# Patient Record
Sex: MALE | Race: WHITE
[De-identification: names, ages, dates, MRNs, and addresses within clinical notes are randomized per-mention and may not be internally consistent; named-entity substitution may affect disease eponyms.]

---

## 2019-03-10 ENCOUNTER — HOSPITAL ENCOUNTER (INPATIENT)
Dept: HOSPITAL 94 - ER | Age: 59
LOS: 5 days | Discharge: HOME HEALTH | DRG: 234 | End: 2019-03-15
Attending: THORACIC SURGERY (CARDIOTHORACIC VASCULAR SURGERY) | Admitting: HOSPITALIST
Payer: COMMERCIAL

## 2019-03-10 VITALS — WEIGHT: 223.11 LBS | BODY MASS INDEX: 30.22 KG/M2 | HEIGHT: 72 IN

## 2019-03-10 VITALS — SYSTOLIC BLOOD PRESSURE: 145 MMHG | DIASTOLIC BLOOD PRESSURE: 65 MMHG

## 2019-03-10 VITALS — SYSTOLIC BLOOD PRESSURE: 163 MMHG | DIASTOLIC BLOOD PRESSURE: 78 MMHG

## 2019-03-10 VITALS — DIASTOLIC BLOOD PRESSURE: 77 MMHG | SYSTOLIC BLOOD PRESSURE: 135 MMHG

## 2019-03-10 VITALS — SYSTOLIC BLOOD PRESSURE: 142 MMHG | DIASTOLIC BLOOD PRESSURE: 80 MMHG

## 2019-03-10 VITALS — SYSTOLIC BLOOD PRESSURE: 166 MMHG | DIASTOLIC BLOOD PRESSURE: 81 MMHG

## 2019-03-10 VITALS — SYSTOLIC BLOOD PRESSURE: 119 MMHG | DIASTOLIC BLOOD PRESSURE: 57 MMHG

## 2019-03-10 VITALS — SYSTOLIC BLOOD PRESSURE: 167 MMHG | DIASTOLIC BLOOD PRESSURE: 81 MMHG

## 2019-03-10 VITALS — SYSTOLIC BLOOD PRESSURE: 135 MMHG | DIASTOLIC BLOOD PRESSURE: 65 MMHG

## 2019-03-10 VITALS — SYSTOLIC BLOOD PRESSURE: 164 MMHG | DIASTOLIC BLOOD PRESSURE: 79 MMHG

## 2019-03-10 VITALS — SYSTOLIC BLOOD PRESSURE: 138 MMHG | DIASTOLIC BLOOD PRESSURE: 74 MMHG

## 2019-03-10 VITALS — SYSTOLIC BLOOD PRESSURE: 180 MMHG | DIASTOLIC BLOOD PRESSURE: 79 MMHG

## 2019-03-10 DIAGNOSIS — Z79.82: ICD-10-CM

## 2019-03-10 DIAGNOSIS — I21.4: Primary | ICD-10-CM

## 2019-03-10 DIAGNOSIS — M54.9: ICD-10-CM

## 2019-03-10 DIAGNOSIS — Z80.0: ICD-10-CM

## 2019-03-10 DIAGNOSIS — M54.32: ICD-10-CM

## 2019-03-10 DIAGNOSIS — I65.21: ICD-10-CM

## 2019-03-10 DIAGNOSIS — I25.110: ICD-10-CM

## 2019-03-10 DIAGNOSIS — F17.200: ICD-10-CM

## 2019-03-10 DIAGNOSIS — Z80.41: ICD-10-CM

## 2019-03-10 DIAGNOSIS — G89.29: ICD-10-CM

## 2019-03-10 DIAGNOSIS — I51.3: ICD-10-CM

## 2019-03-10 LAB
ALBUMIN SERPL BCP-MCNC: 3.5 G/DL (ref 3.4–5)
ALBUMIN SERPL BCP-MCNC: 3.7 G/DL (ref 3.4–5)
ALBUMIN/GLOB SERPL: 1.2 {RATIO} (ref 1.1–1.5)
ALBUMIN/GLOB SERPL: 1.2 {RATIO} (ref 1.1–1.5)
ALP SERPL-CCNC: 74 IU/L (ref 46–116)
ALP SERPL-CCNC: 96 IU/L (ref 46–116)
ALT SERPL W P-5'-P-CCNC: 31 U/L (ref 12–78)
ALT SERPL W P-5'-P-CCNC: 33 U/L (ref 12–78)
AMPHETAMINES UR QL SCN: NEGATIVE
ANION GAP SERPL CALCULATED.3IONS-SCNC: 10 MMOL/L (ref 8–16)
ANION GAP SERPL CALCULATED.3IONS-SCNC: 10 MMOL/L (ref 8–16)
APTT PPP: 27 SECONDS (ref 22–32)
AST SERPL W P-5'-P-CCNC: 16 U/L (ref 10–37)
AST SERPL W P-5'-P-CCNC: 17 U/L (ref 10–37)
BARBITURATES UR QL SCN: NEGATIVE
BASE EXCESS BLDA CALC-SCNC: -1 MMOL/L (ref -2–3)
BASOPHILS # BLD AUTO: 0.1 X10'3 (ref 0–0.2)
BASOPHILS NFR BLD AUTO: 0.8 % (ref 0–1)
BENZODIAZ UR QL SCN: NEGATIVE
BILIRUB SERPL-MCNC: 0.2 MG/DL (ref 0.1–1)
BILIRUB SERPL-MCNC: 0.3 MG/DL (ref 0.1–1)
BODY TEMPERATURE: 37
BUN SERPL-MCNC: 12 MG/DL (ref 7–18)
BUN SERPL-MCNC: 14 MG/DL (ref 7–18)
BUN/CREAT SERPL: 13.6 (ref 5.4–32)
BUN/CREAT SERPL: 13.7 (ref 5.4–32)
BZE UR QL SCN: NEGATIVE
CALCIUM SERPL-MCNC: 8.8 MG/DL (ref 8.5–10.1)
CALCIUM SERPL-MCNC: 9 MG/DL (ref 8.5–10.1)
CANNABINOIDS UR QL SCN: NEGATIVE
CHLORIDE SERPL-SCNC: 106 MMOL/L (ref 99–107)
CHLORIDE SERPL-SCNC: 107 MMOL/L (ref 99–107)
CLARITY UR: CLEAR
CO2 SERPL-SCNC: 22.5 MMOL/L (ref 24–32)
CO2 SERPL-SCNC: 24.4 MMOL/L (ref 24–32)
COHGB MFR BLDA: 1.1 % (ref 0.5–1.5)
COLOR UR: YELLOW
CREAT SERPL-MCNC: 0.88 MG/DL (ref 0.6–1.1)
CREAT SERPL-MCNC: 1.02 MG/DL (ref 0.6–1.1)
EOSINOPHIL # BLD AUTO: 0.2 X10'3 (ref 0–0.9)
EOSINOPHIL NFR BLD AUTO: 2.2 % (ref 0–6)
ERYTHROCYTE [DISTWIDTH] IN BLOOD BY AUTOMATED COUNT: 13.9 % (ref 11.5–14.5)
GFR SERPL CREATININE-BSD FRML MDRD: 75 ML/MIN
GFR SERPL CREATININE-BSD FRML MDRD: 89 ML/MIN
GLUCOSE SERPL-MCNC: 122 MG/DL (ref 70–104)
GLUCOSE SERPL-MCNC: 99 MG/DL (ref 70–104)
GLUCOSE UR STRIP-MCNC: NEGATIVE MG/DL
HBA1C MFR BLD: 6.6 % (ref 4.5–6.2)
HCO3 BLDA-SCNC: 22.6 MMOL/L (ref 22–26)
HCT VFR BLD AUTO: 43.1 % (ref 42–52)
HGB BLD-MCNC: 14.8 G/DL (ref 14–17.9)
HGB BLDA-MCNC: 13.7 G/DL (ref 14–18)
HGB UR QL STRIP: NEGATIVE
INHALED O2 FLOW RATE: 2 L/MIN
INR PPP: 0.9 INR
KETONES UR STRIP-MCNC: NEGATIVE MG/DL
LEUKOCYTE ESTERASE UR QL STRIP: NEGATIVE
LYMPHOCYTES # BLD AUTO: 2.7 X10'3 (ref 1.1–4.8)
LYMPHOCYTES NFR BLD AUTO: 34.3 % (ref 21–51)
MCH RBC QN AUTO: 33.2 PG (ref 27–31)
MCHC RBC AUTO-ENTMCNC: 34.5 G/DL (ref 33–36.5)
MCV RBC AUTO: 96.4 FL (ref 78–98)
METHADONE UR QL SCN: NEGATIVE
METHGB MFR BLDA: 0.2 % (ref 0.3–1.12)
MONOCYTES # BLD AUTO: 0.6 X10'3 (ref 0–0.9)
MONOCYTES NFR BLD AUTO: 8.2 % (ref 2–12)
NEUTROPHILS # BLD AUTO: 4.3 X10'3 (ref 1.8–7.7)
NEUTROPHILS NFR BLD AUTO: 54.5 % (ref 42–75)
NITRITE UR QL STRIP: NEGATIVE
OPIATES UR QL SCN: NEGATIVE
OXYHGB MFR BLDA: 96 % (ref 94–100)
PCO2 TEMP ADJ BLDA: 34.5 MMHG (ref 35–48)
PCP UR QL SCN: NEGATIVE
PH TEMP ADJ BLDA: 7.43 [PH] (ref 7.35–7.45)
PH UR STRIP: 5 [PH] (ref 4.8–8)
PLATELET # BLD AUTO: 240 X10'3 (ref 140–440)
PMV BLD AUTO: 7.5 FL (ref 7.4–10.4)
PO2 TEMP ADJ BLD: 101.5 MMHG (ref 83–108)
POTASSIUM SERPL-SCNC: 4.1 MMOL/L (ref 3.5–5.1)
POTASSIUM SERPL-SCNC: 4.4 MMOL/L (ref 3.5–5.1)
PROT SERPL-MCNC: 6.4 G/DL (ref 6.4–8.2)
PROT SERPL-MCNC: 6.9 G/DL (ref 6.4–8.2)
PROT UR QL STRIP: NEGATIVE MG/DL
PROTHROMBIN TIME: 9.4 SECONDS (ref 9–12)
RBC # BLD AUTO: 4.47 X10'6 (ref 4.7–6.1)
SAO2 % BLDA: 97.3 % (ref 95–98)
SODIUM SERPL-SCNC: 139 MMOL/L (ref 135–145)
SODIUM SERPL-SCNC: 140 MMOL/L (ref 135–145)
SP GR UR STRIP: 1.01 (ref 1–1.03)
URN COLLECT METHOD CLASS: (no result)
UROBILINOGEN UR STRIP-MCNC: 0.2 E.U/DL (ref 0.2–1)
WBC # BLD AUTO: 7.8 X10'3 (ref 4.5–11)

## 2019-03-10 PROCEDURE — 93325 DOPPLER ECHO COLOR FLOW MAPG: CPT

## 2019-03-10 PROCEDURE — 94060 EVALUATION OF WHEEZING: CPT

## 2019-03-10 PROCEDURE — 80048 BASIC METABOLIC PNL TOTAL CA: CPT

## 2019-03-10 PROCEDURE — 85347 COAGULATION TIME ACTIVATED: CPT

## 2019-03-10 PROCEDURE — 93306 TTE W/DOPPLER COMPLETE: CPT

## 2019-03-10 PROCEDURE — 85025 COMPLETE CBC W/AUTO DIFF WBC: CPT

## 2019-03-10 PROCEDURE — 97530 THERAPEUTIC ACTIVITIES: CPT

## 2019-03-10 PROCEDURE — 87070 CULTURE OTHR SPECIMN AEROBIC: CPT

## 2019-03-10 PROCEDURE — 94002 VENT MGMT INPAT INIT DAY: CPT

## 2019-03-10 PROCEDURE — 86920 COMPATIBILITY TEST SPIN: CPT

## 2019-03-10 PROCEDURE — 97116 GAIT TRAINING THERAPY: CPT

## 2019-03-10 PROCEDURE — 97161 PT EVAL LOW COMPLEX 20 MIN: CPT

## 2019-03-10 PROCEDURE — 96374 THER/PROPH/DIAG INJ IV PUSH: CPT

## 2019-03-10 PROCEDURE — 84132 ASSAY OF SERUM POTASSIUM: CPT

## 2019-03-10 PROCEDURE — 93458 L HRT ARTERY/VENTRICLE ANGIO: CPT

## 2019-03-10 PROCEDURE — 99152 MOD SED SAME PHYS/QHP 5/>YRS: CPT

## 2019-03-10 PROCEDURE — 84100 ASSAY OF PHOSPHORUS: CPT

## 2019-03-10 PROCEDURE — 94668 MNPJ CHEST WALL SBSQ: CPT

## 2019-03-10 PROCEDURE — 83735 ASSAY OF MAGNESIUM: CPT

## 2019-03-10 PROCEDURE — 93005 ELECTROCARDIOGRAM TRACING: CPT

## 2019-03-10 PROCEDURE — 82803 BLOOD GASES ANY COMBINATION: CPT

## 2019-03-10 PROCEDURE — 82948 REAGENT STRIP/BLOOD GLUCOSE: CPT

## 2019-03-10 PROCEDURE — 36415 COLL VENOUS BLD VENIPUNCTURE: CPT

## 2019-03-10 PROCEDURE — 84484 ASSAY OF TROPONIN QUANT: CPT

## 2019-03-10 PROCEDURE — 82330 ASSAY OF CALCIUM: CPT

## 2019-03-10 PROCEDURE — B41C1ZZ FLUOROSCOPY OF PELVIC ARTERIES USING LOW OSMOLAR CONTRAST: ICD-10-PCS | Performed by: INTERNAL MEDICINE

## 2019-03-10 PROCEDURE — 86885 COOMBS TEST INDIRECT QUAL: CPT

## 2019-03-10 PROCEDURE — 93970 EXTREMITY STUDY: CPT

## 2019-03-10 PROCEDURE — 93880 EXTRACRANIAL BILAT STUDY: CPT

## 2019-03-10 PROCEDURE — 80061 LIPID PANEL: CPT

## 2019-03-10 PROCEDURE — B41F1ZZ FLUOROSCOPY OF RIGHT LOWER EXTREMITY ARTERIES USING LOW OSMOLAR CONTRAST: ICD-10-PCS | Performed by: INTERNAL MEDICINE

## 2019-03-10 PROCEDURE — B31N1ZZ FLUOROSCOPY OF OTHER UPPER ARTERIES USING LOW OSMOLAR CONTRAST: ICD-10-PCS | Performed by: INTERNAL MEDICINE

## 2019-03-10 PROCEDURE — 86900 BLOOD TYPING SEROLOGIC ABO: CPT

## 2019-03-10 PROCEDURE — 85384 FIBRINOGEN ACTIVITY: CPT

## 2019-03-10 PROCEDURE — 81003 URINALYSIS AUTO W/O SCOPE: CPT

## 2019-03-10 PROCEDURE — 84295 ASSAY OF SERUM SODIUM: CPT

## 2019-03-10 PROCEDURE — 80053 COMPREHEN METABOLIC PANEL: CPT

## 2019-03-10 PROCEDURE — B2151ZZ FLUOROSCOPY OF LEFT HEART USING LOW OSMOLAR CONTRAST: ICD-10-PCS | Performed by: INTERNAL MEDICINE

## 2019-03-10 PROCEDURE — 85610 PROTHROMBIN TIME: CPT

## 2019-03-10 PROCEDURE — B2111ZZ FLUOROSCOPY OF MULTIPLE CORONARY ARTERIES USING LOW OSMOLAR CONTRAST: ICD-10-PCS | Performed by: INTERNAL MEDICINE

## 2019-03-10 PROCEDURE — 4A023N7 MEASUREMENT OF CARDIAC SAMPLING AND PRESSURE, LEFT HEART, PERCUTANEOUS APPROACH: ICD-10-PCS | Performed by: INTERNAL MEDICINE

## 2019-03-10 PROCEDURE — 83036 HEMOGLOBIN GLYCOSYLATED A1C: CPT

## 2019-03-10 PROCEDURE — 80305 DRUG TEST PRSMV DIR OPT OBS: CPT

## 2019-03-10 PROCEDURE — 71045 X-RAY EXAM CHEST 1 VIEW: CPT

## 2019-03-10 PROCEDURE — 36600 WITHDRAWAL OF ARTERIAL BLOOD: CPT

## 2019-03-10 PROCEDURE — 94760 N-INVAS EAR/PLS OXIMETRY 1: CPT

## 2019-03-10 PROCEDURE — 0232T NJX PLATELET PLASMA: CPT

## 2019-03-10 PROCEDURE — 94640 AIRWAY INHALATION TREATMENT: CPT

## 2019-03-10 PROCEDURE — B3111ZZ FLUOROSCOPY OF RIGHT BRACHIOCEPHALIC-SUBCLAVIAN ARTERY USING LOW OSMOLAR CONTRAST: ICD-10-PCS | Performed by: INTERNAL MEDICINE

## 2019-03-10 PROCEDURE — 85730 THROMBOPLASTIN TIME PARTIAL: CPT

## 2019-03-10 PROCEDURE — 99285 EMERGENCY DEPT VISIT HI MDM: CPT

## 2019-03-10 PROCEDURE — 93312 ECHO TRANSESOPHAGEAL: CPT

## 2019-03-10 PROCEDURE — 85018 HEMOGLOBIN: CPT

## 2019-03-10 PROCEDURE — 82947 ASSAY GLUCOSE BLOOD QUANT: CPT

## 2019-03-10 PROCEDURE — 86901 BLOOD TYPING SEROLOGIC RH(D): CPT

## 2019-03-10 PROCEDURE — 82435 ASSAY OF BLOOD CHLORIDE: CPT

## 2019-03-10 RX ADMIN — HEPARIN SODIUM AND DEXTROSE SCH MLS/HR: 10000; 5 INJECTION INTRAVENOUS at 12:36

## 2019-03-10 RX ADMIN — SODIUM CHLORIDE SCH MLS/HR: 9 INJECTION INTRAMUSCULAR; INTRAVENOUS; SUBCUTANEOUS at 15:05

## 2019-03-10 RX ADMIN — DOCUSATE SODIUM SCH MG: 100 CAPSULE, LIQUID FILLED ORAL at 19:47

## 2019-03-10 RX ADMIN — HEPARIN SODIUM AND DEXTROSE SCH MLS/HR: 10000; 5 INJECTION INTRAVENOUS at 19:52

## 2019-03-10 RX ADMIN — HYDROCODONE BITARTRATE AND ACETAMINOPHEN PRN TAB: 10; 325 TABLET ORAL at 21:31

## 2019-03-10 RX ADMIN — METOPROLOL TARTRATE SCH MG: 25 TABLET, FILM COATED ORAL at 20:00

## 2019-03-10 NOTE — NUR
Problems reprioritized. Patient report given, questions answered & plan of care reviewed 
with LUIS Newell.

## 2019-03-10 NOTE — NUR
Patient in room CICU 2012. I have received report from Abdirizak SMITH from cath lab and had the 
opportunity to ask questions and assume patient care.

## 2019-03-10 NOTE — NUR
Ultrasound tech at bedside to do carotid ultrasound and vein-mapping. RT to do pulmonary 
function test. Education regarding IS use and pre/post op interventions implemented and 
reinforced with patient. Will continue to monitor patient and to assess for further 
education needs.

## 2019-03-10 NOTE — NUR
Patient in room ED 3. I have received report from Sena SMITH and had the opportunity to ask 
questions and assume patient care.

## 2019-03-10 NOTE — NUR
Right fem shealth in place, ART line BP transduced to pressure bag, crisped waveform noted. 
site soft to touch, no hematoma noted. dressing c/d/i. no c/p numbness or tingling to leg. 
cap refill < 3sec. palpable peripheral pulses to all extremities.

## 2019-03-10 NOTE — NUR
pt is awake and alert, oriented x4, bradycardic and hypertensive but asymptomatic. family at 
bedside answering admission questions. educated to keep leg flat and laying flat in bed. 
awaiting dr. smith to consult.

## 2019-03-10 NOTE — NUR
[facility] I have received report from Adrianne Lobo and had the opportunity to 
ask questions and assume patient care.

-------------------------------------------------------------------------------

Addendum: 03/10/19 at 1854 by Reece Foley RN

-------------------------------------------------------------------------------

(Accidentally saved noted without finishing) Patient laying supine in bed with daughter at 
bedside, BP in 170s systolically via arterial sheath line, HR in low 50s in sinus 
bradycardia. Patient placed in reverse Trendelenburg for dinner. Educated patient regarding 
plan for pre-op prepping tonight for CABG in AM. Will continue to monitor patient.

## 2019-03-11 VITALS — SYSTOLIC BLOOD PRESSURE: 126 MMHG | DIASTOLIC BLOOD PRESSURE: 58 MMHG

## 2019-03-11 VITALS — DIASTOLIC BLOOD PRESSURE: 60 MMHG | SYSTOLIC BLOOD PRESSURE: 124 MMHG

## 2019-03-11 VITALS — SYSTOLIC BLOOD PRESSURE: 128 MMHG | DIASTOLIC BLOOD PRESSURE: 63 MMHG

## 2019-03-11 VITALS — SYSTOLIC BLOOD PRESSURE: 110 MMHG | DIASTOLIC BLOOD PRESSURE: 55 MMHG

## 2019-03-11 VITALS — SYSTOLIC BLOOD PRESSURE: 150 MMHG | DIASTOLIC BLOOD PRESSURE: 70 MMHG

## 2019-03-11 VITALS — DIASTOLIC BLOOD PRESSURE: 62 MMHG | SYSTOLIC BLOOD PRESSURE: 130 MMHG

## 2019-03-11 VITALS — SYSTOLIC BLOOD PRESSURE: 126 MMHG | DIASTOLIC BLOOD PRESSURE: 44 MMHG

## 2019-03-11 VITALS — SYSTOLIC BLOOD PRESSURE: 128 MMHG | DIASTOLIC BLOOD PRESSURE: 62 MMHG

## 2019-03-11 VITALS — DIASTOLIC BLOOD PRESSURE: 62 MMHG | SYSTOLIC BLOOD PRESSURE: 123 MMHG

## 2019-03-11 VITALS — DIASTOLIC BLOOD PRESSURE: 67 MMHG | SYSTOLIC BLOOD PRESSURE: 146 MMHG

## 2019-03-11 VITALS — SYSTOLIC BLOOD PRESSURE: 127 MMHG | DIASTOLIC BLOOD PRESSURE: 58 MMHG

## 2019-03-11 VITALS — SYSTOLIC BLOOD PRESSURE: 118 MMHG | DIASTOLIC BLOOD PRESSURE: 61 MMHG

## 2019-03-11 VITALS — DIASTOLIC BLOOD PRESSURE: 55 MMHG | SYSTOLIC BLOOD PRESSURE: 103 MMHG

## 2019-03-11 VITALS — SYSTOLIC BLOOD PRESSURE: 138 MMHG | DIASTOLIC BLOOD PRESSURE: 65 MMHG

## 2019-03-11 VITALS — SYSTOLIC BLOOD PRESSURE: 148 MMHG | DIASTOLIC BLOOD PRESSURE: 70 MMHG

## 2019-03-11 VITALS — DIASTOLIC BLOOD PRESSURE: 58 MMHG | SYSTOLIC BLOOD PRESSURE: 123 MMHG

## 2019-03-11 VITALS — SYSTOLIC BLOOD PRESSURE: 114 MMHG | DIASTOLIC BLOOD PRESSURE: 58 MMHG

## 2019-03-11 VITALS — SYSTOLIC BLOOD PRESSURE: 121 MMHG | DIASTOLIC BLOOD PRESSURE: 63 MMHG

## 2019-03-11 VITALS — SYSTOLIC BLOOD PRESSURE: 119 MMHG

## 2019-03-11 LAB
ACT BLD: 121 SEC (ref 101–148)
ACT BLD: 137 SEC (ref 101–148)
ACT BLD: 262 SEC (ref 193–297)
ACT BLD: 350 SEC (ref 260–420)
ACT BLD: 599 SEC (ref 101–148)
ACT BLD: 650 SEC (ref 101–148)
ALBUMIN SERPL BCP-MCNC: 2.8 G/DL (ref 3.4–5)
ALBUMIN SERPL BCP-MCNC: 3.2 G/DL (ref 3.4–5)
ALBUMIN SERPL BCP-MCNC: 3.3 G/DL (ref 3.4–5)
ALBUMIN/GLOB SERPL: 1.1 {RATIO} (ref 1.1–1.5)
ALBUMIN/GLOB SERPL: 1.3 {RATIO} (ref 1.1–1.5)
ALP SERPL-CCNC: 51 IU/L (ref 46–116)
ALP SERPL-CCNC: 68 IU/L (ref 46–116)
ALT SERPL W P-5'-P-CCNC: 25 U/L (ref 12–78)
ALT SERPL W P-5'-P-CCNC: 30 U/L (ref 12–78)
ANION GAP SERPL CALCULATED.3IONS-SCNC: 8 MMOL/L (ref 8–16)
ANION GAP SERPL CALCULATED.3IONS-SCNC: 9 MMOL/L (ref 8–16)
ANION GAP SERPL CALCULATED.3IONS-SCNC: 9 MMOL/L (ref 8–16)
APTT PPP: 26 SECONDS (ref 22–32)
AST SERPL W P-5'-P-CCNC: 19 U/L (ref 10–37)
AST SERPL W P-5'-P-CCNC: 21 U/L (ref 10–37)
BASE EXCESS BLDA CALC-SCNC: -1.2 MMOL/L (ref -2–3)
BASE EXCESS BLDA CALC-SCNC: -1.2 MMOL/L (ref -2–3)
BASE EXCESS BLDA CALC-SCNC: -1.8 MMOL/L (ref -2–3)
BASE EXCESS BLDA CALC-SCNC: 0.1 MMOL/L (ref -2–3)
BASE EXCESS BLDA CALC-SCNC: 0.3 MMOL/L (ref -2–3)
BASE EXCESS BLDV CALC-SCNC: -2.1 MMOL/L
BASOPHILS # BLD AUTO: 0 X10'3 (ref 0–0.2)
BASOPHILS # BLD AUTO: 0 X10'3 (ref 0–0.2)
BASOPHILS # BLD AUTO: 0.1 X10'3 (ref 0–0.2)
BASOPHILS NFR BLD AUTO: 0.1 % (ref 0–1)
BASOPHILS NFR BLD AUTO: 0.2 % (ref 0–1)
BASOPHILS NFR BLD AUTO: 0.5 % (ref 0–1)
BILIRUB SERPL-MCNC: 0.3 MG/DL (ref 0.1–1)
BILIRUB SERPL-MCNC: 0.3 MG/DL (ref 0.1–1)
BODY TEMPERATURE: 37
BODY TEMPERATURE: 37
BUN SERPL-MCNC: 10 MG/DL (ref 7–18)
BUN SERPL-MCNC: 11 MG/DL (ref 7–18)
BUN SERPL-MCNC: 13 MG/DL (ref 7–18)
BUN/CREAT SERPL: 12.9 (ref 5.4–32)
BUN/CREAT SERPL: 13.7 (ref 5.4–32)
BUN/CREAT SERPL: 9.5 (ref 5.4–32)
CA-I BLD-SCNC: 1.12 MMOL/L (ref 1.03–1.32)
CA-I BLD-SCNC: 1.22 MMOL/L (ref 1.03–1.32)
CA-I BLD-SCNC: 1.28 MMOL/L (ref 1.03–1.32)
CA-I BLD-SCNC: 1.44 MMOL/L (ref 1.03–1.32)
CALCIUM SERPL-MCNC: 8.5 MG/DL (ref 8.5–10.1)
CALCIUM SERPL-MCNC: 8.8 MG/DL (ref 8.5–10.1)
CALCIUM SERPL-MCNC: 9.4 MG/DL (ref 8.5–10.1)
CHLORIDE BLD-SCNC: 104 MMOL/L (ref 99–107)
CHLORIDE BLD-SCNC: 106 MMOL/L (ref 99–107)
CHLORIDE BLD-SCNC: 107 MMOL/L (ref 99–107)
CHLORIDE BLD-SCNC: 107 MMOL/L (ref 99–107)
CHLORIDE SERPL-SCNC: 107 MMOL/L (ref 99–107)
CHLORIDE SERPL-SCNC: 108 MMOL/L (ref 99–107)
CHLORIDE SERPL-SCNC: 108 MMOL/L (ref 99–107)
CHOLEST SERPL-MCNC: 169 MG/DL (ref 0–200)
CHOLEST/HDLC SERPL: 5.1 {RATIO} (ref 0–4.99)
CO2 SERPL-SCNC: 23.6 MMOL/L (ref 24–32)
CO2 SERPL-SCNC: 24.7 MMOL/L (ref 24–32)
CO2 SERPL-SCNC: 25.3 MMOL/L (ref 24–32)
COHGB MFR BLDA: 0.1 % (ref 0.5–1.5)
COHGB MFR BLDA: 0.2 % (ref 0.5–1.5)
COHGB MFR BLDA: 0.3 % (ref 0.5–1.5)
COHGB MFR BLDA: 0.3 % (ref 0.5–1.5)
COHGB MFR BLDA: 0.7 % (ref 0.5–1.5)
COHGB MFR BLDV: 0.7 %
CREAT SERPL-MCNC: 0.85 MG/DL (ref 0.6–1.1)
CREAT SERPL-MCNC: 0.95 MG/DL (ref 0.6–1.1)
CREAT SERPL-MCNC: 1.05 MG/DL (ref 0.6–1.1)
DO-HGB MFR BLDV: 12 %
EOSINOPHIL # BLD AUTO: 0 X10'3 (ref 0–0.9)
EOSINOPHIL # BLD AUTO: 0.1 X10'3 (ref 0–0.9)
EOSINOPHIL # BLD AUTO: 0.1 X10'3 (ref 0–0.9)
EOSINOPHIL NFR BLD AUTO: 0 % (ref 0–6)
EOSINOPHIL NFR BLD AUTO: 0.7 % (ref 0–6)
EOSINOPHIL NFR BLD AUTO: 0.7 % (ref 0–6)
ERYTHROCYTE [DISTWIDTH] IN BLOOD BY AUTOMATED COUNT: 13.7 % (ref 11.5–14.5)
ERYTHROCYTE [DISTWIDTH] IN BLOOD BY AUTOMATED COUNT: 13.8 % (ref 11.5–14.5)
ERYTHROCYTE [DISTWIDTH] IN BLOOD BY AUTOMATED COUNT: 14.2 % (ref 11.5–14.5)
GFR SERPL CREATININE-BSD FRML MDRD: 72 ML/MIN
GFR SERPL CREATININE-BSD FRML MDRD: 81 ML/MIN
GFR SERPL CREATININE-BSD FRML MDRD: > 90 ML/MIN
GLUCOSE BLD-MCNC: 104 MG/DL (ref 70–105)
GLUCOSE BLD-MCNC: 110 MG/DL (ref 70–105)
GLUCOSE BLD-MCNC: 120 MG/DL (ref 70–105)
GLUCOSE BLD-MCNC: 139 MG/DL (ref 70–105)
GLUCOSE SERPL-MCNC: 134 MG/DL (ref 70–104)
GLUCOSE SERPL-MCNC: 178 MG/DL (ref 70–104)
GLUCOSE SERPL-MCNC: 95 MG/DL (ref 70–104)
HCO3 BLDA-SCNC: 22.6 MMOL/L (ref 22–26)
HCO3 BLDA-SCNC: 23.8 MMOL/L (ref 22–26)
HCO3 BLDA-SCNC: 23.8 MMOL/L (ref 22–26)
HCO3 BLDA-SCNC: 25.3 MMOL/L (ref 22–26)
HCO3 BLDA-SCNC: 25.3 MMOL/L (ref 22–26)
HCO3 BLDV-SCNC: 23.5 MMOL/L
HCT VFR BLD AUTO: 33.8 % (ref 42–52)
HCT VFR BLD AUTO: 35.7 % (ref 42–52)
HCT VFR BLD AUTO: 37.7 % (ref 42–52)
HDLC SERPL-MCNC: 33 MG/DL (ref 35–60)
HGB BLD-MCNC: 11.6 G/DL (ref 14–17.9)
HGB BLD-MCNC: 12.2 G/DL (ref 14–17.9)
HGB BLD-MCNC: 13.1 G/DL (ref 14–17.9)
HGB BLDA-MCNC: 10.3 G/DL (ref 14–18)
HGB BLDA-MCNC: 11.1 G/DL (ref 14–18)
HGB BLDA-MCNC: 12 G/DL (ref 14–18)
HGB BLDA-MCNC: 12.6 G/DL (ref 14–18)
HGB BLDA-MCNC: 12.7 G/DL (ref 14–18)
HGB BLDA-MCNC: 9.4 G/DL (ref 14–18)
INR PPP: 1.1 INR
LDLC SERPL DIRECT ASSAY-MCNC: 122 MG/DL (ref 50–100)
LYMPHOCYTES # BLD AUTO: 0.6 X10'3 (ref 1.1–4.8)
LYMPHOCYTES # BLD AUTO: 1.4 X10'3 (ref 1.1–4.8)
LYMPHOCYTES # BLD AUTO: 2.7 X10'3 (ref 1.1–4.8)
LYMPHOCYTES NFR BLD AUTO: 11.3 % (ref 21–51)
LYMPHOCYTES NFR BLD AUTO: 24.5 % (ref 21–51)
LYMPHOCYTES NFR BLD AUTO: 4.9 % (ref 21–51)
MAGNESIUM SERPL-MCNC: 1.9 MG/DL (ref 1.5–2.4)
MAGNESIUM SERPL-MCNC: 2.2 MG/DL (ref 1.5–2.4)
MAGNESIUM SERPL-MCNC: 3.3 MG/DL (ref 1.5–2.4)
MCH RBC QN AUTO: 32.9 PG (ref 27–31)
MCH RBC QN AUTO: 33.2 PG (ref 27–31)
MCH RBC QN AUTO: 33.4 PG (ref 27–31)
MCHC RBC AUTO-ENTMCNC: 34.1 G/DL (ref 33–36.5)
MCHC RBC AUTO-ENTMCNC: 34.4 G/DL (ref 33–36.5)
MCHC RBC AUTO-ENTMCNC: 34.7 G/DL (ref 33–36.5)
MCV RBC AUTO: 96.1 FL (ref 78–98)
MCV RBC AUTO: 96.3 FL (ref 78–98)
MCV RBC AUTO: 96.6 FL (ref 78–98)
METHGB MFR BLD: 0.4 %
METHGB MFR BLDA: 0.2 % (ref 0.3–1.12)
METHGB MFR BLDA: 0.2 % (ref 0.3–1.12)
METHGB MFR BLDA: 0.3 % (ref 0.3–1.12)
METHGB MFR BLDA: 0.4 % (ref 0.3–1.12)
METHGB MFR BLDA: 0.4 % (ref 0.3–1.12)
MONOCYTES # BLD AUTO: 0.4 X10'3 (ref 0–0.9)
MONOCYTES # BLD AUTO: 0.6 X10'3 (ref 0–0.9)
MONOCYTES # BLD AUTO: 0.8 X10'3 (ref 0–0.9)
MONOCYTES NFR BLD AUTO: 3.1 % (ref 2–12)
MONOCYTES NFR BLD AUTO: 5.7 % (ref 2–12)
MONOCYTES NFR BLD AUTO: 6.2 % (ref 2–12)
NA (ABG): 130 MMOL/L (ref 135–145)
NA (ABG): 133 MMOL/L (ref 135–145)
NA (ABG): 134 MMOL/L (ref 135–145)
NA (ABG): 136 MMOL/L (ref 135–145)
NEUTROPHILS # BLD AUTO: 10.4 X10'3 (ref 1.8–7.7)
NEUTROPHILS # BLD AUTO: 11.3 X10'3 (ref 1.8–7.7)
NEUTROPHILS # BLD AUTO: 7.5 X10'3 (ref 1.8–7.7)
NEUTROPHILS NFR BLD AUTO: 68.6 % (ref 42–75)
NEUTROPHILS NFR BLD AUTO: 81.6 % (ref 42–75)
NEUTROPHILS NFR BLD AUTO: 91.9 % (ref 42–75)
O2/TOTAL GAS SETTING VFR VENT: 40 MMHG/%
O2/TOTAL GAS SETTING VFR VENT: 80 MMHG/%
OXYHGB MFR BLDA: 95 % (ref 94–100)
OXYHGB MFR BLDA: 95.2 % (ref 94–100)
OXYHGB MFR BLDA: 97.5 % (ref 94–100)
OXYHGB MFR BLDA: 97.6 % (ref 94–100)
OXYHGB MFR BLDA: 98.6 % (ref 94–100)
OXYHGB MFR BLDV: 86.9 %
PCO2 BLDA: 37.4 MMHG (ref 35–45)
PCO2 BLDA: 41.2 MMHG (ref 35–45)
PCO2 BLDA: 41.9 MMHG (ref 35–45)
PCO2 BLDV: 43.5 MMHG
PCO2 TEMP ADJ BLDA: 40.9 MMHG (ref 35–48)
PCO2 TEMP ADJ BLDA: 43 MMHG (ref 35–48)
PEEP RESPIRATORY: 5 CM H2O
PEEP RESPIRATORY: 5 CM H2O
PH BLDA: 7.38 [PH] (ref 7.35–7.45)
PH BLDA: 7.4 [PH] (ref 7.35–7.45)
PH BLDA: 7.4 [PH] (ref 7.35–7.45)
PH TEMP ADJ BLDA: 7.38 [PH] (ref 7.35–7.45)
PH TEMP ADJ BLDA: 7.39 [PH] (ref 7.35–7.45)
PHOSPHATE SERPL-MCNC: 1.4 MG/DL (ref 2.3–4.5)
PHOSPHATE SERPL-MCNC: 3.4 MG/DL (ref 2.3–4.5)
PLATELET # BLD AUTO: 149 X10'3 (ref 140–440)
PLATELET # BLD AUTO: 151 X10'3 (ref 140–440)
PLATELET # BLD AUTO: 208 X10'3 (ref 140–440)
PMV BLD AUTO: 7.5 FL (ref 7.4–10.4)
PMV BLD AUTO: 8 FL (ref 7.4–10.4)
PMV BLD AUTO: 8.1 FL (ref 7.4–10.4)
PO2 BLDA: 132.6 MMHG (ref 60–100)
PO2 BLDA: 134.3 MMHG (ref 60–100)
PO2 BLDA: 458.1 MMHG (ref 60–100)
PO2 BLDV: 57 MMHG
PO2 TEMP ADJ BLD: 82.9 MMHG (ref 83–108)
PO2 TEMP ADJ BLD: 86.8 MMHG (ref 83–108)
POTASSIUM BLDA-SCNC: 4.2 MMOL/L (ref 3.3–5.1)
POTASSIUM BLDA-SCNC: 4.5 MMOL/L (ref 3.3–5.1)
POTASSIUM BLDA-SCNC: 5 MMOL/L (ref 3.3–5.1)
POTASSIUM BLDA-SCNC: 5.1 MMOL/L (ref 3.3–5.1)
POTASSIUM SERPL-SCNC: 4.4 MMOL/L (ref 3.5–5.1)
PROT SERPL-MCNC: 5 G/DL (ref 6.4–8.2)
PROT SERPL-MCNC: 6 G/DL (ref 6.4–8.2)
PROTHROMBIN TIME: 10.7 SECONDS (ref 9–12)
RBC # BLD AUTO: 3.51 X10'6 (ref 4.7–6.1)
RBC # BLD AUTO: 3.7 X10'6 (ref 4.7–6.1)
RBC # BLD AUTO: 3.93 X10'6 (ref 4.7–6.1)
RESP RATE 1H: 12 B/MIN
RESP RATE RESTING: 11 B/MIN
SAO2 % BLDA: 95.5 % (ref 95–98)
SAO2 % BLDA: 95.8 % (ref 95–98)
SAO2 % BLDA: 98.1 % (ref 95–98)
SAO2 % BLDA: 98.5 % (ref 95–98)
SAO2 % BLDA: 99.1 % (ref 95–98)
SODIUM SERPL-SCNC: 141 MMOL/L (ref 135–145)
SPONT VT COMPRES GAS VOL SET UNCOR VENT: 650 ML
TRIGL SERPL-MCNC: 169 MG/DL (ref 20–135)
WBC # BLD AUTO: 11 X10'3 (ref 4.5–11)
WBC # BLD AUTO: 12.2 X10'3 (ref 4.5–11)
WBC # BLD AUTO: 12.8 X10'3 (ref 4.5–11)

## 2019-03-11 PROCEDURE — 02HV33Z INSERTION OF INFUSION DEVICE INTO SUPERIOR VENA CAVA, PERCUTANEOUS APPROACH: ICD-10-PCS | Performed by: THORACIC SURGERY (CARDIOTHORACIC VASCULAR SURGERY)

## 2019-03-11 PROCEDURE — B24BZZ4 ULTRASONOGRAPHY OF HEART WITH AORTA, TRANSESOPHAGEAL: ICD-10-PCS | Performed by: THORACIC SURGERY (CARDIOTHORACIC VASCULAR SURGERY)

## 2019-03-11 PROCEDURE — 4A133B3 MONITORING OF ARTERIAL PRESSURE, PULMONARY, PERCUTANEOUS APPROACH: ICD-10-PCS | Performed by: THORACIC SURGERY (CARDIOTHORACIC VASCULAR SURGERY)

## 2019-03-11 PROCEDURE — 5A1221Z PERFORMANCE OF CARDIAC OUTPUT, CONTINUOUS: ICD-10-PCS | Performed by: THORACIC SURGERY (CARDIOTHORACIC VASCULAR SURGERY)

## 2019-03-11 PROCEDURE — 02100Z9 BYPASS CORONARY ARTERY, ONE ARTERY FROM LEFT INTERNAL MAMMARY, OPEN APPROACH: ICD-10-PCS | Performed by: THORACIC SURGERY (CARDIOTHORACIC VASCULAR SURGERY)

## 2019-03-11 PROCEDURE — 021109W BYPASS CORONARY ARTERY, TWO ARTERIES FROM AORTA WITH AUTOLOGOUS VENOUS TISSUE, OPEN APPROACH: ICD-10-PCS | Performed by: THORACIC SURGERY (CARDIOTHORACIC VASCULAR SURGERY)

## 2019-03-11 PROCEDURE — 06BQ4ZZ EXCISION OF LEFT SAPHENOUS VEIN, PERCUTANEOUS ENDOSCOPIC APPROACH: ICD-10-PCS | Performed by: THORACIC SURGERY (CARDIOTHORACIC VASCULAR SURGERY)

## 2019-03-11 PROCEDURE — 02HP32Z INSERTION OF MONITORING DEVICE INTO PULMONARY TRUNK, PERCUTANEOUS APPROACH: ICD-10-PCS | Performed by: THORACIC SURGERY (CARDIOTHORACIC VASCULAR SURGERY)

## 2019-03-11 RX ADMIN — DOCUSATE SODIUM SCH MG: 100 CAPSULE, LIQUID FILLED ORAL at 07:59

## 2019-03-11 RX ADMIN — KETOROLAC TROMETHAMINE SCH MG: 15 INJECTION, SOLUTION INTRAMUSCULAR; INTRAVENOUS at 20:21

## 2019-03-11 RX ADMIN — SODIUM CHLORIDE SCH MLS/HR: 9 INJECTION INTRAMUSCULAR; INTRAVENOUS; SUBCUTANEOUS at 00:34

## 2019-03-11 RX ADMIN — VANCOMYCIN HYDROCHLORIDE SCH MLS/HR: 750 INJECTION, POWDER, LYOPHILIZED, FOR SOLUTION INTRAVENOUS at 20:21

## 2019-03-11 RX ADMIN — INSULIN LISPRO SCH UNITS: 100 INJECTION, SOLUTION INTRAVENOUS; SUBCUTANEOUS at 13:00

## 2019-03-11 RX ADMIN — KETOROLAC TROMETHAMINE SCH MG: 15 INJECTION, SOLUTION INTRAMUSCULAR; INTRAVENOUS at 14:04

## 2019-03-11 RX ADMIN — INSULIN LISPRO SCH UNITS: 100 INJECTION, SOLUTION INTRAVENOUS; SUBCUTANEOUS at 18:00

## 2019-03-11 RX ADMIN — HYDROCODONE BITARTRATE AND ACETAMINOPHEN PRN TAB: 10; 325 TABLET ORAL at 02:45

## 2019-03-11 RX ADMIN — INSULIN HUMAN SCH MLS/HR: 100 INJECTION, SOLUTION PARENTERAL at 05:23

## 2019-03-11 RX ADMIN — HYDROCODONE BITARTRATE AND ACETAMINOPHEN PRN TAB: 10; 325 TABLET ORAL at 18:54

## 2019-03-11 RX ADMIN — MUPIROCIN SCH APPLIC: 20 OINTMENT TOPICAL at 05:09

## 2019-03-11 RX ADMIN — METOPROLOL TARTRATE SCH MG: 25 TABLET, FILM COATED ORAL at 07:59

## 2019-03-11 RX ADMIN — MUPIROCIN SCH APPLIC: 20 OINTMENT TOPICAL at 20:22

## 2019-03-11 RX ADMIN — Medication SCH MLS/HR: at 16:41

## 2019-03-11 RX ADMIN — DOCUSATE SODIUM SCH MG: 100 CAPSULE, LIQUID FILLED ORAL at 20:21

## 2019-03-11 RX ADMIN — MUPIROCIN SCH APPLIC: 20 OINTMENT TOPICAL at 07:59

## 2019-03-11 NOTE — NUR
Patient in room CICU 2012. I have received report from Zahra SMITH and had the opportunity to ask 
questions and assume patient care. Patient resting in bed with eyes closed, very drowsy but 
arousable to tactile and verbal stimulus. Respirations even/unlabored and saturating 95% on 
5L NC. Incision site dry/clean/intact, /65 via arterial line and HR in high 70s in 
sinus rhythm. Will continue to monitor patient.

## 2019-03-11 NOTE — NUR
Patient wiped down with chlorhexidine wipes, blood sugar checked (145), BP taken to 
bilateral arms. Insulin bag hung, vancomycin infusion started. Ancef on IV pole and to go 
down with OR crew.

## 2019-03-12 VITALS — SYSTOLIC BLOOD PRESSURE: 115 MMHG | DIASTOLIC BLOOD PRESSURE: 69 MMHG

## 2019-03-12 VITALS — DIASTOLIC BLOOD PRESSURE: 69 MMHG | SYSTOLIC BLOOD PRESSURE: 114 MMHG

## 2019-03-12 VITALS — DIASTOLIC BLOOD PRESSURE: 72 MMHG | SYSTOLIC BLOOD PRESSURE: 133 MMHG

## 2019-03-12 VITALS — SYSTOLIC BLOOD PRESSURE: 118 MMHG | DIASTOLIC BLOOD PRESSURE: 62 MMHG

## 2019-03-12 VITALS — SYSTOLIC BLOOD PRESSURE: 107 MMHG | DIASTOLIC BLOOD PRESSURE: 60 MMHG

## 2019-03-12 VITALS — SYSTOLIC BLOOD PRESSURE: 136 MMHG | DIASTOLIC BLOOD PRESSURE: 60 MMHG

## 2019-03-12 VITALS — SYSTOLIC BLOOD PRESSURE: 106 MMHG | DIASTOLIC BLOOD PRESSURE: 61 MMHG

## 2019-03-12 VITALS — SYSTOLIC BLOOD PRESSURE: 118 MMHG | DIASTOLIC BLOOD PRESSURE: 58 MMHG

## 2019-03-12 VITALS — SYSTOLIC BLOOD PRESSURE: 113 MMHG | DIASTOLIC BLOOD PRESSURE: 64 MMHG

## 2019-03-12 VITALS — DIASTOLIC BLOOD PRESSURE: 69 MMHG | SYSTOLIC BLOOD PRESSURE: 147 MMHG

## 2019-03-12 VITALS — DIASTOLIC BLOOD PRESSURE: 72 MMHG | SYSTOLIC BLOOD PRESSURE: 138 MMHG

## 2019-03-12 VITALS — SYSTOLIC BLOOD PRESSURE: 118 MMHG | DIASTOLIC BLOOD PRESSURE: 71 MMHG

## 2019-03-12 VITALS — DIASTOLIC BLOOD PRESSURE: 65 MMHG | SYSTOLIC BLOOD PRESSURE: 138 MMHG

## 2019-03-12 VITALS — DIASTOLIC BLOOD PRESSURE: 67 MMHG | SYSTOLIC BLOOD PRESSURE: 120 MMHG

## 2019-03-12 VITALS — DIASTOLIC BLOOD PRESSURE: 64 MMHG | SYSTOLIC BLOOD PRESSURE: 105 MMHG

## 2019-03-12 VITALS — DIASTOLIC BLOOD PRESSURE: 54 MMHG | SYSTOLIC BLOOD PRESSURE: 99 MMHG

## 2019-03-12 VITALS — SYSTOLIC BLOOD PRESSURE: 105 MMHG | DIASTOLIC BLOOD PRESSURE: 63 MMHG

## 2019-03-12 VITALS — DIASTOLIC BLOOD PRESSURE: 57 MMHG | SYSTOLIC BLOOD PRESSURE: 125 MMHG

## 2019-03-12 VITALS — SYSTOLIC BLOOD PRESSURE: 128 MMHG | DIASTOLIC BLOOD PRESSURE: 68 MMHG

## 2019-03-12 VITALS — SYSTOLIC BLOOD PRESSURE: 139 MMHG | DIASTOLIC BLOOD PRESSURE: 38 MMHG

## 2019-03-12 VITALS — DIASTOLIC BLOOD PRESSURE: 57 MMHG | SYSTOLIC BLOOD PRESSURE: 94 MMHG

## 2019-03-12 VITALS — SYSTOLIC BLOOD PRESSURE: 139 MMHG | DIASTOLIC BLOOD PRESSURE: 72 MMHG

## 2019-03-12 LAB
ALBUMIN SERPL BCP-MCNC: 3.2 G/DL (ref 3.4–5)
ALBUMIN/GLOB SERPL: 1.3 {RATIO} (ref 1.1–1.5)
ALP SERPL-CCNC: 54 IU/L (ref 46–116)
ALT SERPL W P-5'-P-CCNC: 26 U/L (ref 12–78)
ANION GAP SERPL CALCULATED.3IONS-SCNC: 9 MMOL/L (ref 8–16)
APTT PPP: 25 SECONDS (ref 22–32)
AST SERPL W P-5'-P-CCNC: 31 U/L (ref 10–37)
BASOPHILS # BLD AUTO: 0 X10'3 (ref 0–0.2)
BASOPHILS NFR BLD AUTO: 0.1 % (ref 0–1)
BILIRUB SERPL-MCNC: 0.4 MG/DL (ref 0.1–1)
BUN SERPL-MCNC: 14 MG/DL (ref 7–18)
BUN/CREAT SERPL: 17.7 (ref 5.4–32)
CALCIUM SERPL-MCNC: 8.3 MG/DL (ref 8.5–10.1)
CHLORIDE SERPL-SCNC: 107 MMOL/L (ref 99–107)
CO2 SERPL-SCNC: 23.7 MMOL/L (ref 24–32)
CREAT SERPL-MCNC: 0.79 MG/DL (ref 0.6–1.1)
EOSINOPHIL # BLD AUTO: 0 X10'3 (ref 0–0.9)
EOSINOPHIL NFR BLD AUTO: 0 % (ref 0–6)
ERYTHROCYTE [DISTWIDTH] IN BLOOD BY AUTOMATED COUNT: 13.7 % (ref 11.5–14.5)
GFR SERPL CREATININE-BSD FRML MDRD: > 90 ML/MIN
GLUCOSE SERPL-MCNC: 151 MG/DL (ref 70–104)
HCT VFR BLD AUTO: 34.8 % (ref 42–52)
HGB BLD-MCNC: 12 G/DL (ref 14–17.9)
INR PPP: 1 INR
LYMPHOCYTES # BLD AUTO: 0.8 X10'3 (ref 1.1–4.8)
LYMPHOCYTES NFR BLD AUTO: 4 % (ref 21–51)
LYMPHOCYTES NFR BLD MANUAL: 4 % (ref 21–51)
MACROCYTES BLD QL SMEAR: (no result)
MAGNESIUM SERPL-MCNC: 2.2 MG/DL (ref 1.5–2.4)
MCH RBC QN AUTO: 33.3 PG (ref 27–31)
MCHC RBC AUTO-ENTMCNC: 34.4 G/DL (ref 33–36.5)
MCV RBC AUTO: 96.7 FL (ref 78–98)
MONOCYTES # BLD AUTO: 1.4 X10'3 (ref 0–0.9)
MONOCYTES NFR BLD AUTO: 7.2 % (ref 2–12)
MONOCYTES NFR BLD MANUAL: 5 % (ref 2–12)
NEUTROPHILS # BLD AUTO: 16.9 X10'3 (ref 1.8–7.7)
NEUTROPHILS NFR BLD AUTO: 88.7 % (ref 42–75)
NEUTS BAND # BLD MANUAL: 7 % (ref 0–10)
NEUTS SEG NFR BLD MANUAL: 84 % (ref 42–75)
PHOSPHATE SERPL-MCNC: 3.7 MG/DL (ref 2.3–4.5)
PLATELET # BLD AUTO: 143 X10'3 (ref 140–440)
PLATELET BLD QL SMEAR: NORMAL
PMV BLD AUTO: 8 FL (ref 7.4–10.4)
POTASSIUM SERPL-SCNC: 4.2 MMOL/L (ref 3.5–5.1)
PROT SERPL-MCNC: 5.6 G/DL (ref 6.4–8.2)
PROTHROMBIN TIME: 10 SECONDS (ref 9–12)
RBC # BLD AUTO: 3.6 X10'6 (ref 4.7–6.1)
RBC MORPH BLD: (no result)
SODIUM SERPL-SCNC: 140 MMOL/L (ref 135–145)
TOTAL CELLS COUNTED FLD: 100
WBC # BLD AUTO: 19 X10'3 (ref 4.5–11)

## 2019-03-12 RX ADMIN — HYDROCODONE BITARTRATE AND ACETAMINOPHEN PRN TAB: 10; 325 TABLET ORAL at 11:45

## 2019-03-12 RX ADMIN — Medication SCH MLS/HR: at 16:48

## 2019-03-12 RX ADMIN — VANCOMYCIN HYDROCHLORIDE SCH MLS/HR: 750 INJECTION, POWDER, LYOPHILIZED, FOR SOLUTION INTRAVENOUS at 19:59

## 2019-03-12 RX ADMIN — MUPIROCIN SCH APPLIC: 20 OINTMENT TOPICAL at 19:59

## 2019-03-12 RX ADMIN — KETOROLAC TROMETHAMINE SCH MG: 15 INJECTION, SOLUTION INTRAMUSCULAR; INTRAVENOUS at 07:57

## 2019-03-12 RX ADMIN — HYDROCODONE BITARTRATE AND ACETAMINOPHEN PRN TAB: 10; 325 TABLET ORAL at 19:42

## 2019-03-12 RX ADMIN — DOCUSATE SODIUM SCH MG: 100 CAPSULE, LIQUID FILLED ORAL at 20:00

## 2019-03-12 RX ADMIN — Medication SCH MLS/HR: at 00:58

## 2019-03-12 RX ADMIN — Medication SCH MG: at 07:57

## 2019-03-12 RX ADMIN — KETOROLAC TROMETHAMINE SCH MG: 15 INJECTION, SOLUTION INTRAMUSCULAR; INTRAVENOUS at 01:03

## 2019-03-12 RX ADMIN — INSULIN GLARGINE SCH UNIT: 100 INJECTION, SOLUTION SUBCUTANEOUS at 09:41

## 2019-03-12 RX ADMIN — DOCUSATE SODIUM SCH MG: 100 CAPSULE, LIQUID FILLED ORAL at 07:57

## 2019-03-12 RX ADMIN — METOPROLOL TARTRATE SCH MG: 25 TABLET, FILM COATED ORAL at 08:47

## 2019-03-12 RX ADMIN — INSULIN HUMAN SCH MLS/HR: 100 INJECTION, SOLUTION PARENTERAL at 04:17

## 2019-03-12 RX ADMIN — Medication SCH MLS/HR: at 07:58

## 2019-03-12 RX ADMIN — METOPROLOL TARTRATE SCH MG: 25 TABLET, FILM COATED ORAL at 20:00

## 2019-03-12 RX ADMIN — INSULIN LISPRO SCH UNITS: 100 INJECTION, SOLUTION INTRAVENOUS; SUBCUTANEOUS at 09:43

## 2019-03-12 RX ADMIN — MUPIROCIN SCH APPLIC: 20 OINTMENT TOPICAL at 08:46

## 2019-03-12 RX ADMIN — VANCOMYCIN HYDROCHLORIDE SCH MLS/HR: 750 INJECTION, POWDER, LYOPHILIZED, FOR SOLUTION INTRAVENOUS at 08:47

## 2019-03-12 NOTE — NUR
No changes in patient condition. Patient resting in bed, witnessed using IS and flutter 
valve periodically throughout night, will continue to monitor patient.

## 2019-03-12 NOTE — NUR
Patient in room CICU 2012. I have received report from  Carolina SMITH  and had the opportunity 
to ask questions and assume patient care.



Pt currently in bed. Requesting pain meds for sternal pain s/p CABG. Chest tubes in place. 
Output at 650 at shift change. Pike still in place. Central line still in, art line out. 
Fluids at KVO.. No other concerns at this time.

## 2019-03-12 NOTE — NUR
Problems reprioritized. Patient report given, questions answered & plan of care reviewed 
with Zahra SMITH.

## 2019-03-13 VITALS — SYSTOLIC BLOOD PRESSURE: 112 MMHG | DIASTOLIC BLOOD PRESSURE: 62 MMHG

## 2019-03-13 VITALS — DIASTOLIC BLOOD PRESSURE: 72 MMHG | SYSTOLIC BLOOD PRESSURE: 126 MMHG

## 2019-03-13 VITALS — SYSTOLIC BLOOD PRESSURE: 105 MMHG | DIASTOLIC BLOOD PRESSURE: 58 MMHG

## 2019-03-13 VITALS — DIASTOLIC BLOOD PRESSURE: 58 MMHG | SYSTOLIC BLOOD PRESSURE: 105 MMHG

## 2019-03-13 VITALS — SYSTOLIC BLOOD PRESSURE: 118 MMHG | DIASTOLIC BLOOD PRESSURE: 63 MMHG

## 2019-03-13 VITALS — DIASTOLIC BLOOD PRESSURE: 61 MMHG | SYSTOLIC BLOOD PRESSURE: 111 MMHG

## 2019-03-13 VITALS — DIASTOLIC BLOOD PRESSURE: 73 MMHG | SYSTOLIC BLOOD PRESSURE: 116 MMHG

## 2019-03-13 VITALS — SYSTOLIC BLOOD PRESSURE: 115 MMHG | DIASTOLIC BLOOD PRESSURE: 67 MMHG

## 2019-03-13 VITALS — SYSTOLIC BLOOD PRESSURE: 107 MMHG | DIASTOLIC BLOOD PRESSURE: 60 MMHG

## 2019-03-13 VITALS — DIASTOLIC BLOOD PRESSURE: 60 MMHG | SYSTOLIC BLOOD PRESSURE: 104 MMHG

## 2019-03-13 VITALS — SYSTOLIC BLOOD PRESSURE: 114 MMHG | DIASTOLIC BLOOD PRESSURE: 57 MMHG

## 2019-03-13 VITALS — SYSTOLIC BLOOD PRESSURE: 103 MMHG | DIASTOLIC BLOOD PRESSURE: 96 MMHG

## 2019-03-13 VITALS — SYSTOLIC BLOOD PRESSURE: 120 MMHG | DIASTOLIC BLOOD PRESSURE: 68 MMHG

## 2019-03-13 VITALS — SYSTOLIC BLOOD PRESSURE: 112 MMHG | DIASTOLIC BLOOD PRESSURE: 59 MMHG

## 2019-03-13 VITALS — SYSTOLIC BLOOD PRESSURE: 127 MMHG | DIASTOLIC BLOOD PRESSURE: 64 MMHG

## 2019-03-13 LAB
ALBUMIN SERPL BCP-MCNC: 3 G/DL (ref 3.4–5)
ANION GAP SERPL CALCULATED.3IONS-SCNC: 3 MMOL/L (ref 8–16)
BASOPHILS # BLD AUTO: 0 X10'3 (ref 0–0.2)
BASOPHILS NFR BLD AUTO: 0 % (ref 0–1)
BUN SERPL-MCNC: 21 MG/DL (ref 7–18)
BUN/CREAT SERPL: 23.1 (ref 5.4–32)
CALCIUM SERPL-MCNC: 8.7 MG/DL (ref 8.5–10.1)
CHLORIDE SERPL-SCNC: 107 MMOL/L (ref 99–107)
CO2 SERPL-SCNC: 27.7 MMOL/L (ref 24–32)
CREAT SERPL-MCNC: 0.91 MG/DL (ref 0.6–1.1)
EOSINOPHIL # BLD AUTO: 0 X10'3 (ref 0–0.9)
EOSINOPHIL NFR BLD AUTO: 0 % (ref 0–6)
ERYTHROCYTE [DISTWIDTH] IN BLOOD BY AUTOMATED COUNT: 14 % (ref 11.5–14.5)
GFR SERPL CREATININE-BSD FRML MDRD: 85 ML/MIN
GLUCOSE SERPL-MCNC: 153 MG/DL (ref 70–104)
HCT VFR BLD AUTO: 33.6 % (ref 42–52)
HGB BLD-MCNC: 11.3 G/DL (ref 14–17.9)
LYMPHOCYTES # BLD AUTO: 1.4 X10'3 (ref 1.1–4.8)
LYMPHOCYTES NFR BLD AUTO: 7.9 % (ref 21–51)
LYMPHOCYTES NFR BLD MANUAL: 5 % (ref 21–51)
MACROCYTES BLD QL SMEAR: (no result)
MAGNESIUM SERPL-MCNC: 2.2 MG/DL (ref 1.5–2.4)
MCH RBC QN AUTO: 32.8 PG (ref 27–31)
MCHC RBC AUTO-ENTMCNC: 33.6 G/DL (ref 33–36.5)
MCV RBC AUTO: 97.8 FL (ref 78–98)
MONOCYTES # BLD AUTO: 1.5 X10'3 (ref 0–0.9)
MONOCYTES NFR BLD AUTO: 8.3 % (ref 2–12)
MONOCYTES NFR BLD MANUAL: 5 % (ref 2–12)
NEUTROPHILS # BLD AUTO: 14.8 X10'3 (ref 1.8–7.7)
NEUTROPHILS NFR BLD AUTO: 83.8 % (ref 42–75)
NEUTS BAND # BLD MANUAL: 1 % (ref 0–10)
NEUTS SEG NFR BLD MANUAL: 89 % (ref 42–75)
PHOSPHATE SERPL-MCNC: 2.4 MG/DL (ref 2.3–4.5)
PLATELET # BLD AUTO: 132 X10'3 (ref 140–440)
PLATELET BLD QL SMEAR: (no result)
PMV BLD AUTO: 8.7 FL (ref 7.4–10.4)
POTASSIUM SERPL-SCNC: 4.7 MMOL/L (ref 3.5–5.1)
RBC # BLD AUTO: 3.44 X10'6 (ref 4.7–6.1)
RBC MORPH BLD: (no result)
SODIUM SERPL-SCNC: 138 MMOL/L (ref 135–145)
TOTAL CELLS COUNTED FLD: 100
WBC # BLD AUTO: 17.7 X10'3 (ref 4.5–11)

## 2019-03-13 RX ADMIN — DOCUSATE SODIUM SCH MG: 100 CAPSULE, LIQUID FILLED ORAL at 19:47

## 2019-03-13 RX ADMIN — METOPROLOL TARTRATE SCH MG: 25 TABLET, FILM COATED ORAL at 19:46

## 2019-03-13 RX ADMIN — HYDROCODONE BITARTRATE AND ACETAMINOPHEN PRN TAB: 10; 325 TABLET ORAL at 02:52

## 2019-03-13 RX ADMIN — HYDROCODONE BITARTRATE AND ACETAMINOPHEN PRN TAB: 10; 325 TABLET ORAL at 19:47

## 2019-03-13 RX ADMIN — POTASSIUM CHLORIDE SCH MEQ: 1500 TABLET, EXTENDED RELEASE ORAL at 20:00

## 2019-03-13 RX ADMIN — HYDROCODONE BITARTRATE AND ACETAMINOPHEN PRN TAB: 10; 325 TABLET ORAL at 08:21

## 2019-03-13 RX ADMIN — Medication SCH MLS/HR: at 00:01

## 2019-03-13 RX ADMIN — Medication SCH MG: at 19:47

## 2019-03-13 RX ADMIN — MUPIROCIN SCH APPLIC: 20 OINTMENT TOPICAL at 08:34

## 2019-03-13 RX ADMIN — METOPROLOL TARTRATE SCH MG: 25 TABLET, FILM COATED ORAL at 07:18

## 2019-03-13 RX ADMIN — INSULIN GLARGINE SCH UNIT: 100 INJECTION, SOLUTION SUBCUTANEOUS at 08:52

## 2019-03-13 RX ADMIN — DOCUSATE SODIUM SCH MG: 100 CAPSULE, LIQUID FILLED ORAL at 07:19

## 2019-03-13 RX ADMIN — PANTOPRAZOLE SODIUM SCH MG: 40 TABLET, DELAYED RELEASE ORAL at 07:19

## 2019-03-13 RX ADMIN — HYDROCODONE BITARTRATE AND ACETAMINOPHEN PRN TAB: 10; 325 TABLET ORAL at 12:20

## 2019-03-13 RX ADMIN — Medication SCH MG: at 07:18

## 2019-03-13 NOTE — NUR
CABG consult. Patient seen at bedside and given post cardiac diet education handout with 
verbal review. Discussed importance of protein for wound heal, patient has good appetite and 
eating well. Per pt request will send double protein with dinners. Pt has c/o discomfort r/t 
constipation and is receiving colace however would like additional bowel care, d/w bedside 
RN, and prunes with breakfast, d/w dietary. Provided written heart healthy education handout 
with verbal review as well. 

-------------------------------------------------------------------------------

Addendum: 03/13/19 at 1545 by Shoshana Grande RD

-------------------------------------------------------------------------------

Amended: Links added.

## 2019-03-13 NOTE — NUR
removed rt quad lumen; held pressure for 8minutes; cannula intact. removed f/c. pt tolerated 
procedures well. will continue to monitor

## 2019-03-13 NOTE — NUR
Problems reprioritized. Patient report given, questions answered & plan of care reviewed 
with Essie.

## 2019-03-13 NOTE — NUR
Patient was welcomed and oriented to the floor.  Call bell in reach.  Questions answered 
along with patient education. Patient medicated for pain, per request.

## 2019-03-13 NOTE — NUR
I recorded the urine output at 500 for the 0300 hour, but might have been due to the bag 
being too high. Readjusted the height. There was a leak, reinflated the balloon and also 
turned the patient to clean him up.

## 2019-03-14 VITALS — SYSTOLIC BLOOD PRESSURE: 112 MMHG | DIASTOLIC BLOOD PRESSURE: 62 MMHG

## 2019-03-14 VITALS — SYSTOLIC BLOOD PRESSURE: 123 MMHG | DIASTOLIC BLOOD PRESSURE: 72 MMHG

## 2019-03-14 VITALS — SYSTOLIC BLOOD PRESSURE: 108 MMHG | DIASTOLIC BLOOD PRESSURE: 67 MMHG

## 2019-03-14 VITALS — DIASTOLIC BLOOD PRESSURE: 64 MMHG | SYSTOLIC BLOOD PRESSURE: 121 MMHG

## 2019-03-14 VITALS — DIASTOLIC BLOOD PRESSURE: 60 MMHG | SYSTOLIC BLOOD PRESSURE: 91 MMHG

## 2019-03-14 VITALS — SYSTOLIC BLOOD PRESSURE: 121 MMHG | DIASTOLIC BLOOD PRESSURE: 73 MMHG

## 2019-03-14 LAB
ALBUMIN SERPL BCP-MCNC: 3.1 G/DL (ref 3.4–5)
ANION GAP SERPL CALCULATED.3IONS-SCNC: 7 MMOL/L (ref 8–16)
BASOPHILS # BLD AUTO: 0 X10'3 (ref 0–0.2)
BASOPHILS NFR BLD AUTO: 0.2 % (ref 0–1)
BUN SERPL-MCNC: 24 MG/DL (ref 7–18)
BUN/CREAT SERPL: 22 (ref 5.4–32)
CALCIUM SERPL-MCNC: 8.9 MG/DL (ref 8.5–10.1)
CHLORIDE SERPL-SCNC: 102 MMOL/L (ref 99–107)
CO2 SERPL-SCNC: 29.5 MMOL/L (ref 24–32)
CREAT SERPL-MCNC: 1.09 MG/DL (ref 0.6–1.1)
EOSINOPHIL # BLD AUTO: 0.1 X10'3 (ref 0–0.9)
EOSINOPHIL NFR BLD AUTO: 0.6 % (ref 0–6)
ERYTHROCYTE [DISTWIDTH] IN BLOOD BY AUTOMATED COUNT: 14 % (ref 11.5–14.5)
GFR SERPL CREATININE-BSD FRML MDRD: 69 ML/MIN
GLUCOSE SERPL-MCNC: 98 MG/DL (ref 70–104)
HCT VFR BLD AUTO: 35.7 % (ref 42–52)
HGB BLD-MCNC: 12.3 G/DL (ref 14–17.9)
LYMPHOCYTES # BLD AUTO: 2.5 X10'3 (ref 1.1–4.8)
LYMPHOCYTES NFR BLD AUTO: 20.1 % (ref 21–51)
MAGNESIUM SERPL-MCNC: 1.9 MG/DL (ref 1.5–2.4)
MCH RBC QN AUTO: 33.3 PG (ref 27–31)
MCHC RBC AUTO-ENTMCNC: 34.5 G/DL (ref 33–36.5)
MCV RBC AUTO: 96.6 FL (ref 78–98)
MONOCYTES # BLD AUTO: 1.4 X10'3 (ref 0–0.9)
MONOCYTES NFR BLD AUTO: 11.4 % (ref 2–12)
NEUTROPHILS # BLD AUTO: 8.3 X10'3 (ref 1.8–7.7)
NEUTROPHILS NFR BLD AUTO: 67.7 % (ref 42–75)
PLATELET # BLD AUTO: 146 X10'3 (ref 140–440)
PMV BLD AUTO: 8.6 FL (ref 7.4–10.4)
POTASSIUM SERPL-SCNC: 4.3 MMOL/L (ref 3.5–5.1)
RBC # BLD AUTO: 3.7 X10'6 (ref 4.7–6.1)
SODIUM SERPL-SCNC: 138 MMOL/L (ref 135–145)
WBC # BLD AUTO: 12.3 X10'3 (ref 4.5–11)

## 2019-03-14 RX ADMIN — PANTOPRAZOLE SODIUM SCH MG: 40 TABLET, DELAYED RELEASE ORAL at 08:15

## 2019-03-14 RX ADMIN — Medication SCH MG: at 08:14

## 2019-03-14 RX ADMIN — DOCUSATE SODIUM SCH MG: 100 CAPSULE, LIQUID FILLED ORAL at 08:16

## 2019-03-14 RX ADMIN — HYDROCODONE BITARTRATE AND ACETAMINOPHEN PRN TAB: 10; 325 TABLET ORAL at 11:14

## 2019-03-14 RX ADMIN — METOPROLOL TARTRATE SCH MG: 25 TABLET, FILM COATED ORAL at 08:16

## 2019-03-14 RX ADMIN — DOCUSATE SODIUM SCH MG: 100 CAPSULE, LIQUID FILLED ORAL at 19:35

## 2019-03-14 RX ADMIN — METOPROLOL TARTRATE SCH MG: 25 TABLET, FILM COATED ORAL at 19:39

## 2019-03-14 RX ADMIN — POTASSIUM CHLORIDE SCH MEQ: 1500 TABLET, EXTENDED RELEASE ORAL at 08:14

## 2019-03-14 RX ADMIN — POTASSIUM CHLORIDE SCH MEQ: 1500 TABLET, EXTENDED RELEASE ORAL at 19:35

## 2019-03-14 RX ADMIN — Medication SCH MG: at 19:35

## 2019-03-14 RX ADMIN — INSULIN GLARGINE SCH UNIT: 100 INJECTION, SOLUTION SUBCUTANEOUS at 08:18

## 2019-03-14 RX ADMIN — HYDROCODONE BITARTRATE AND ACETAMINOPHEN PRN TAB: 10; 325 TABLET ORAL at 19:35

## 2019-03-14 RX ADMIN — HYDROCODONE BITARTRATE AND ACETAMINOPHEN PRN TAB: 10; 325 TABLET ORAL at 04:19

## 2019-03-14 NOTE — NUR
Patient in room . I have received report from Vianca and had the opportunity to ask 
questions and assume patient care.

## 2019-03-15 VITALS — SYSTOLIC BLOOD PRESSURE: 106 MMHG | DIASTOLIC BLOOD PRESSURE: 68 MMHG

## 2019-03-15 VITALS — DIASTOLIC BLOOD PRESSURE: 62 MMHG | SYSTOLIC BLOOD PRESSURE: 107 MMHG

## 2019-03-15 VITALS — SYSTOLIC BLOOD PRESSURE: 111 MMHG

## 2019-03-15 LAB
ALBUMIN SERPL BCP-MCNC: 2.8 G/DL (ref 3.4–5)
ANION GAP SERPL CALCULATED.3IONS-SCNC: 6 MMOL/L (ref 8–16)
BASOPHILS # BLD AUTO: 0 X10'3 (ref 0–0.2)
BASOPHILS NFR BLD AUTO: 0.4 % (ref 0–1)
BUN SERPL-MCNC: 24 MG/DL (ref 7–18)
BUN/CREAT SERPL: 21.6 (ref 5.4–32)
CALCIUM SERPL-MCNC: 8.8 MG/DL (ref 8.5–10.1)
CHLORIDE SERPL-SCNC: 104 MMOL/L (ref 99–107)
CO2 SERPL-SCNC: 27.6 MMOL/L (ref 24–32)
CREAT SERPL-MCNC: 1.11 MG/DL (ref 0.6–1.1)
EOSINOPHIL # BLD AUTO: 0.2 X10'3 (ref 0–0.9)
EOSINOPHIL NFR BLD AUTO: 2 % (ref 0–6)
ERYTHROCYTE [DISTWIDTH] IN BLOOD BY AUTOMATED COUNT: 13.5 % (ref 11.5–14.5)
GFR SERPL CREATININE-BSD FRML MDRD: 68 ML/MIN
GLUCOSE SERPL-MCNC: 125 MG/DL (ref 70–104)
HCT VFR BLD AUTO: 35.8 % (ref 42–52)
HGB BLD-MCNC: 12.1 G/DL (ref 14–17.9)
LYMPHOCYTES # BLD AUTO: 2.2 X10'3 (ref 1.1–4.8)
LYMPHOCYTES NFR BLD AUTO: 21.1 % (ref 21–51)
MAGNESIUM SERPL-MCNC: 2.2 MG/DL (ref 1.5–2.4)
MCH RBC QN AUTO: 32.8 PG (ref 27–31)
MCHC RBC AUTO-ENTMCNC: 33.9 G/DL (ref 33–36.5)
MCV RBC AUTO: 96.7 FL (ref 78–98)
MONOCYTES # BLD AUTO: 1.1 X10'3 (ref 0–0.9)
MONOCYTES NFR BLD AUTO: 10.7 % (ref 2–12)
NEUTROPHILS # BLD AUTO: 6.9 X10'3 (ref 1.8–7.7)
NEUTROPHILS NFR BLD AUTO: 65.8 % (ref 42–75)
PLATELET # BLD AUTO: 162 X10'3 (ref 140–440)
PMV BLD AUTO: 8.3 FL (ref 7.4–10.4)
POTASSIUM SERPL-SCNC: 4.6 MMOL/L (ref 3.5–5.1)
RBC # BLD AUTO: 3.7 X10'6 (ref 4.7–6.1)
SODIUM SERPL-SCNC: 138 MMOL/L (ref 135–145)
WBC # BLD AUTO: 10.4 X10'3 (ref 4.5–11)

## 2019-03-15 RX ADMIN — Medication SCH MG: at 07:32

## 2019-03-15 RX ADMIN — POTASSIUM CHLORIDE SCH MEQ: 1500 TABLET, EXTENDED RELEASE ORAL at 08:00

## 2019-03-15 RX ADMIN — HYDROCODONE BITARTRATE AND ACETAMINOPHEN PRN TAB: 10; 325 TABLET ORAL at 03:38

## 2019-03-15 RX ADMIN — HYDROCODONE BITARTRATE AND ACETAMINOPHEN PRN TAB: 10; 325 TABLET ORAL at 07:38

## 2019-03-15 RX ADMIN — INSULIN GLARGINE SCH UNIT: 100 INJECTION, SOLUTION SUBCUTANEOUS at 08:00

## 2019-03-15 RX ADMIN — PANTOPRAZOLE SODIUM SCH MG: 40 TABLET, DELAYED RELEASE ORAL at 07:34

## 2019-03-15 RX ADMIN — DOCUSATE SODIUM SCH MG: 100 CAPSULE, LIQUID FILLED ORAL at 08:00

## 2019-03-15 RX ADMIN — METOPROLOL TARTRATE SCH MG: 25 TABLET, FILM COATED ORAL at 07:35

## 2019-03-15 NOTE — NUR
Student documentation:

I have reviewed and agree with all interventions, assessments performed and documented by 
Lenora RNS.

## 2019-03-15 NOTE — NUR
I went over discharge instructions with patients thoroughly.  Next medication administration 
doses were written on packet.  Follow-up appointment time-frames were given.  Sternal 
precautions were reiterated. Right arm IV was removed, catheter intact.